# Patient Record
Sex: FEMALE | Race: WHITE | NOT HISPANIC OR LATINO | URBAN - METROPOLITAN AREA
[De-identification: names, ages, dates, MRNs, and addresses within clinical notes are randomized per-mention and may not be internally consistent; named-entity substitution may affect disease eponyms.]

---

## 2023-08-28 ENCOUNTER — HOSPITAL ENCOUNTER (OUTPATIENT)
Dept: DATA CONVERSION | Facility: HOSPITAL | Age: 28
End: 2023-08-28
Attending: OBSTETRICS & GYNECOLOGY | Admitting: OBSTETRICS & GYNECOLOGY

## 2023-08-28 DIAGNOSIS — Z30.2 ENCOUNTER FOR STERILIZATION: ICD-10-CM

## 2023-08-28 DIAGNOSIS — N83.8 OTHER NONINFLAMMATORY DISORDERS OF OVARY, FALLOPIAN TUBE AND BROAD LIGAMENT: ICD-10-CM

## 2023-09-12 LAB
COMPLETE PATHOLOGY REPORT: NORMAL
CONVERTED CLINICAL DIAGNOSIS-HISTORY: NORMAL
CONVERTED FINAL DIAGNOSIS: NORMAL
CONVERTED FINAL REPORT PDF LINK TO COPY AND PASTE: NORMAL
CONVERTED GROSS DESCRIPTION: NORMAL

## 2023-09-30 NOTE — H&P
"    History & Physical Reviewed:   Pregnant/Lactating:  ·  Are You Pregnant no (1)   ·  Are You Currently Breastfeeding no (1)     I have reviewed the History and Physical dated:  27-Aug-2023   History and Physical reviewed and relevant findings noted. Patient examined to review pertinent physical  findings.: No significant changes   Home Medications Reviewed: no changes noted   Allergies Reviewed: no changes noted       ERAS (Enhanced Recovery After Surgery):  ·  ERAS Patient: no     Consent:   COVID-19 Consent:  ·  COVID-19 Risk Consent Surgeon has reviewed key risks related to the risk of areli COVID-19 and if they contract COVID-19 what the risks are.       Electronic Signatures:  Becka Nix)  (Signed 28-Aug-2023 11:40)   Authored: History & Physical Reviewed, ERAS, Consent,  Note Completion      Last Updated: 28-Aug-2023 11:40 by Becka Nix (MD)    References:  1.  Data Referenced From \"History and Physical - Surgery > 30 days\" 27-Aug-2023 07:01   "

## 2023-09-30 NOTE — H&P
History of Present Illness:   Pregnant/Lactating:  ·  Are You Pregnant no   ·  Are You Currently Breastfeeding no     History Present Illness:  Reason for surgery: tubal   HPI:    Pt is a 28yo  that presents for surgery due to complete family status.  She is done having children and would like permanent sterilization.  No other problems.    Allergies:        Allergies:  ·  No Known Allergies :     Home Medication Review:   Home Medications Reviewed: yes     Impression/Procedure:   ·  Impression and Planned Procedure: lap B/L salpingectomy       ERAS (Enhanced Recovery After Surgery):  ·  ERAS Patient: no     Review of Systems:   Review of Systems:  Constitutional: NEGATIVE: Fever, Chills, Anorexia,  Weight Loss, Malaise     Respiratory: NEGATIVE: Dry Cough, Productive Cough,  Hemoptysis, Wheezing, Shortness of Breath     Cardiac: NEGATIVE: Chest Pain, Dyspnea on Exertion,  Orthopnea, Palpitations, Syncope     Genitourinary: NEGATIVE: Discharge, Dysuria, Flank  Pain, Frequency, Hematuria         Physical Exam by System:    Constitutional: Well developed, awake/alert/oriented  x3, no distress, alert and cooperative   Respiratory/Thorax: Patent airways, CTAB, normal  breath sounds with good chest expansion, thorax symmetric   Cardiovascular: Regular, rate and rhythm, no murmurs,  2+ equal pulses of the extremities, normal S 1and S 2   Gastrointestinal: Nondistended, soft, non-tender,  no rebound tenderness or guarding, no masses palpable, no organomegaly, +BS, no bruits   Genitourinary: No Discharge, vesicles or other abnormalities   Musculoskeletal: ROM intact, no joint swelling, normal  strength   Extremities: normal extremities, no cyanosis edema,  contusions or wounds, no clubbing   Psychological: Appropriate mood and behavior   Skin: Warm and dry, no lesions, no rashes     Consent:   COVID-19 Consent:  ·  COVID-19 Risk Consent Surgeon has reviewed key risks related to the risk of areli COVID-19 and  if they contract COVID-19 what the risks are.       Electronic Signatures:  Dahlia Deluca ()  (Signed 27-Aug-2023 07:04)   Authored: History of Present Illness, Allergies, Home  Medication Review, Impression/Procedure, ERAS, Review of Systems, Physical Exam, Consent, Note Completion      Last Updated: 27-Aug-2023 07:04 by Dahlia Deluca (DO)

## 2023-10-01 NOTE — OP NOTE
Post Operative Note:     PreOp Diagnosis: complete family status   Post-Procedure Diagnosis: same   Procedure: 1. Lap B/L salpingectomy  2.   3.   4.   5.   Surgeon: Dr Deluca   Resident/Fellow/Other Assistant: Dr Nix   Anesthesia: gen   Estimated Blood Loss (mL): none   Specimen: yes. B/L tubes   Complications: none   Findings: normal uterus, tubes and ovaries   Patient Returned To/Condition: stable   Urine Output: 50cc     Operative Report Dictated:  Dictation: not applicable - note contains Operative  Report   Operative Report:    Indications for procedure: Pt is a 26 yo  requesting permanent surgical sterilization surgery.  She was counseled on alternatives including long acting reversible  contraception.  Surgical risks were discussed including the risk of regret and consent obtained.      Operative findings:   Bimanual exam revealed a mobile, anteverted uterus. No adnexal masses appreciated.   Laparoscopy revealed normal appearing uterus, fallopian tubes and ovaries.  No other abdominal or pelvic pathology noted.      Description of procedure     The patient was taken to the operating room and placed in supine position on the operating room table.  A stop-check was performed confirming the patient and the procedure to be completed.  General anesthesia was administered. She was then placed in the  dorsolithotomy position with her legs in Chong stirrups.  SCDs were placed and turned on.  Bimanual exam was performed with findings as noted above.  The abdomen, perineum, and vagina were prepped sterilely.  The patient was then draped in standard sterile  fashion.  A bivalve speculum was placed in the patient's vagina and the anterior lip of the cervix was grasped with a single tooth tenaculum.  A Hulka uterine manipulator was then placed and the speculum was removed.  The bladder was then drained via  morris catheterization.      A 5 mm skin incision was made at the base of the umbilicus after the skin was  injected with2 percent lidocaine with epi.  A veres needle was inserted without problems, with good opening pressure and water flow.  The abdomin was insufflated.  A 5mm trochar  was inserted through this incision under direct visualization using the Visiport.  Intraperitoneal placement was confirmed.  The abdomen and pelvis were inspected with findings as noted above.   Second and third 5mm skin incisions were then made in the  right and left lower quadrants.  5mm trochars were then inserted under direct visualization.  The right fallopian tube was then identified and traced to its fimbriated end. It was carefully dissected from the right ovary using the Ligasure device.  It  was amputated at the level of the uterus.  The left fallopian tube was identified and traced to its fimbriated end and the same procedure was performed.  The tubes were removed and will be sent to pathology. The pedicles were examined and noted to be  hemostatic.      The ports were then removed and the abdomen was desufflated.   The skin incisions were closed with suture.  Dermabond was placed.  The uterine manipulator was removed and the tenaculum was removed from the cervix.  Hemostasis from the tenaculum sites  was noted.  No bleeding noted coming from the uterus.  The patient was cleaned and dried and reversed from anesthesia.  She was returned to supine position.  The patient was taken to the PACU in stable and satisfactory condition.  All sponge, needle,  and instrument counts were correct at the end of the procedure.     Dr Nix assisted due to no available residents.      Electronic Signatures:  Dahlia Deluca)  (Signed 28-Aug-2023 12:41)   Authored: Post Operative Note, Note Completion      Last Updated: 28-Aug-2023 12:41 by Dahlia Deluca ()

## 2024-07-11 ENCOUNTER — APPOINTMENT (OUTPATIENT)
Dept: OBSTETRICS AND GYNECOLOGY | Facility: CLINIC | Age: 29
End: 2024-07-11
Payer: MEDICAID

## 2024-07-11 VITALS
BODY MASS INDEX: 20.66 KG/M2 | SYSTOLIC BLOOD PRESSURE: 108 MMHG | WEIGHT: 121 LBS | DIASTOLIC BLOOD PRESSURE: 75 MMHG | HEIGHT: 64 IN

## 2024-07-11 DIAGNOSIS — N92.6 IRREGULAR MENSTRUAL BLEEDING: Primary | ICD-10-CM

## 2024-07-11 DIAGNOSIS — Z01.419 WELL WOMAN EXAM WITH ROUTINE GYNECOLOGICAL EXAM: ICD-10-CM

## 2024-07-11 PROBLEM — N81.89 PELVIC FLOOR WEAKNESS: Status: ACTIVE | Noted: 2024-07-11

## 2024-07-11 PROBLEM — N39.41 URGE INCONTINENCE OF URINE: Status: ACTIVE | Noted: 2024-07-11

## 2024-07-11 LAB — TSH SERPL-ACNC: 0.96 MIU/L (ref 0.44–3.98)

## 2024-07-11 PROCEDURE — 36415 COLL VENOUS BLD VENIPUNCTURE: CPT

## 2024-07-11 PROCEDURE — 1036F TOBACCO NON-USER: CPT | Performed by: ADVANCED PRACTICE MIDWIFE

## 2024-07-11 PROCEDURE — 84443 ASSAY THYROID STIM HORMONE: CPT

## 2024-07-11 PROCEDURE — 99395 PREV VISIT EST AGE 18-39: CPT | Performed by: ADVANCED PRACTICE MIDWIFE

## 2024-07-11 PROCEDURE — 87624 HPV HI-RISK TYP POOLED RSLT: CPT

## 2024-07-11 ASSESSMENT — PATIENT HEALTH QUESTIONNAIRE - PHQ9
SUM OF ALL RESPONSES TO PHQ9 QUESTIONS 1 AND 2: 0
2. FEELING DOWN, DEPRESSED OR HOPELESS: NOT AT ALL
1. LITTLE INTEREST OR PLEASURE IN DOING THINGS: NOT AT ALL

## 2024-07-11 ASSESSMENT — ENCOUNTER SYMPTOMS
DEPRESSION: 0
OCCASIONAL FEELINGS OF UNSTEADINESS: 0
LOSS OF SENSATION IN FEET: 0

## 2024-07-11 NOTE — PROGRESS NOTES
"Subjective   Cecilia Hough is a 28 y.o. female who is here for a routine well woman exam.     Concerns today:  Irregular periods since BTL 2023,  was normal and regular for a few months, then since 2024, periods have been btwn 34-54 days    Patient's last menstrual period was 2024 (exact date).   Periods are irregular, lasting 5 days.   Dysmenorrhea:mild, occurring premenstrually and first 1-2 days of flow.   Cyclic symptoms include irritability, moodiness, and pelvic pain.     Sexual Activity: not sexually active, not currently, but active with male partners. 0 partners in the last 12 months.  Pain with intercourse? No   Loss of desire? No   Able to have an orgasm? Yes     History of prior STI: chlamydia , treated    Current contraception: tubal ligation    Last pap:   History of abnormal Pap smear: yes - ASCUS/HPV neg  Treatment for cervical dysplasia: no  Received HPV vaccine series?: no, declines    Family history of breast cancer or ovarian cancer: no    Menstrual History:  OB History          2    Para   2    Term   2            AB        Living   2         SAB        IAB        Ectopic        Multiple        Live Births   2                Menarche age: 10  Patient's last menstrual period was 2024 (exact date).     Objective   /75   Ht 1.626 m (5' 4\")   Wt 54.9 kg (121 lb)   LMP 2024 (Exact Date)   BMI 20.77 kg/m²     Physical Exam  Constitutional:       Appearance: Normal appearance.   HENT:      Head: Normocephalic.      Nose: Nose normal.      Mouth/Throat:      Mouth: Mucous membranes are moist.      Pharynx: Oropharynx is clear.   Eyes:      Conjunctiva/sclera: Conjunctivae normal.   Cardiovascular:      Rate and Rhythm: Normal rate and regular rhythm.   Pulmonary:      Effort: Pulmonary effort is normal.      Breath sounds: Normal breath sounds.   Abdominal:      General: Abdomen is flat.      Palpations: Abdomen is soft.   Genitourinary:     " General: Normal vulva.      Vagina: Normal.      Cervix: Normal.      Uterus: Normal.    Musculoskeletal:         General: Normal range of motion.      Cervical back: Normal range of motion and neck supple.   Skin:     General: Skin is warm and dry.   Neurological:      Mental Status: She is alert.   Psychiatric:         Mood and Affect: Mood normal.         Behavior: Behavior normal.          Assessment/Plan   Normal well woman exam  Continue healthy lifestyle habits  Consider taking a multivitamin daily  Continue recommended women's health screenings  Pap collected, if normal, repeat in 5 yrs  Irregular periods  TSH ordered  If TSH WNLs, will consider Inositol as she does not want hormonal birth control.    Return to care for annual exam or sooner as needed.    Rosetta Fermin, JEROME-JAN

## 2024-07-22 LAB
CYTOLOGY CMNT CVX/VAG CYTO-IMP: NORMAL
HPV HR 12 DNA GENITAL QL NAA+PROBE: NEGATIVE
HPV HR GENOTYPES PNL CVX NAA+PROBE: NEGATIVE
HPV16 DNA SPEC QL NAA+PROBE: NEGATIVE
HPV18 DNA SPEC QL NAA+PROBE: NEGATIVE
LAB AP HPV GENOTYPE QUESTION: YES
LAB AP HPV HR: NORMAL
LABORATORY COMMENT REPORT: NORMAL
PATH REPORT.TOTAL CANCER: NORMAL